# Patient Record
(demographics unavailable — no encounter records)

---

## 2024-10-17 NOTE — PROCEDURE
[Today's Date:] : Date: [unfilled] [Therapeutic] : therapeutic [#1 Site: ______] : #1 site identified in the [unfilled] [Alcohol] : alcohol [Tolerated Well] : the patient tolerated the procedure well [No Complications] : there were no complications [Instructions Given] : handouts/patient instructions were given to patient [de-identified] : ELENITA [de-identified] : 1ml of kenalog-80

## 2024-10-17 NOTE — PHYSICAL EXAM
[TextEntry] : Gen: In NAD HEENT: EOMI Resp: Non-labored breathing Musculoskeletal: Normal gait, no clubbing or cyanosis of the fingernails, no joint swelling seen and muscle strength and tone were normal.  Bilateral shoulders with FROM, slight pain in R shoulder with abduction to 90 degrees. No active synovitis, +[place] nodes. R foot with slightly larger arch.

## 2024-10-17 NOTE — HISTORY OF PRESENT ILLNESS
[FreeTextEntry1] : 85 y/o woman with hx of osteoporosis and atrial tachycardia, diagnosed with afib, s/p ablation,  HTN, cardiac stents, managed by me for osteoporosis, shoulder impingement, and sciatica in the past.  Last seen in July 2024 for bilateral shoulder pain. S/p IM Kenalog injections which provides relief. Pt here again complaining of bilateral shoulder pain (R>L). Has difficulty sleeping at night due to pain.  She had her US guided left shoulder injected previously, and it was helpful.   However, currently prefers IM shot instead of joint injection.   S/p L knee replacement (had previously had steroid and gel injections that did not help). Reports significant improvement in pain, including resolution of L hip pain.  She saw Dr. Mancera at [facility]  S/p dental implant, however never returned to office for prolia shot. Is very overdue She takes Ca/Vit D She takes 1000IU daily Vit D and 2000mg Calcium Most recent Vit D level 43   Asking if she should have orthotics for her shoes, reports worsening arch in R foot which causes discomfort   No hx of fractures  Alcohol intake occasional glass of wine  Non-smoker   Her prior rheum, Dr. Martins, who used to give her prolia, retired.  DEXAs T score -2.2 11/19/19 FRAX score 17%/5%  2021 FRAX is 17/5.3% T score -2.1  2023 T score -2.0 States she has hx of prior Bone density showing T score in osteoporotic range, many years ago.

## 2024-10-17 NOTE — ASSESSMENT
[FreeTextEntry1] : 82 y/o woman with hx of osteoporosis and atrial tachycardia, presents for management of osteoporosis. She also has c/o bilateral shoulder pain (R>L)   S/p dental implant, however never resumed prolia  Informed her of rebound worsening of osteoporosis/fracture with delay or abrupt stop of prolia, thus it is imperative to restart as soon as possible denies any further upcoming dental work  Has cataract surgery soon, prefers to restart prolia after cataract surgery DEXA 4/26/2023 T score -2.0. CMP, Vit D levels reviewed from outside labs. C/w calcium and Vit D supplementation   b/L shoulder pain Left not very active currently Right shoulder with pain at 90 degree mike, likely impingement.   States the IM kenalog injection usually helps her shoulder pains.  S/p IM Kenalog injection today in RUE (see procedure note)   Injected b/L GTB with 80mg kenalog previously, but she finds it's more helpful under US guidance, so now gets that done at ortho office. Reports improvement in L hip pain since TKR    Will set up appt with RNs for prolia injection otherwise RPA with me 6 months

## 2025-01-17 NOTE — PHYSICAL EXAM
[de-identified] : LEFT TENDERNESS [de-identified] : LEFT WEAK CAPILARIES KISSELBACH CAUTERIZED [Normal] : mucosa is normal [Midline] : trachea located in midline position [Edentulous] : edentulous [de-identified] : UPPER TEETH IMPLANTS/ LOWER PROSTHESIS

## 2025-01-17 NOTE — ASSESSMENT
[FreeTextEntry1] : MUPIROCIN BID PT TAKING PROLIA AND SIDE EFFECTS TO BE MONITORED EPISTAXIS INSTRUCTIONS F/U ONE MONTH

## 2025-02-25 NOTE — PHYSICAL EXAM
[de-identified] : LEFT TENDERNESS [de-identified] : LEFT WEAK CAPILARIES KISSELBACH CAUTERIZED ARE STILL IRRITATED/ RIGHT NASAL MUCOSAL IRRITATION [Normal] : mucosa is normal [Midline] : trachea located in midline position [Edentulous] : edentulous [de-identified] : UPPER TEETH IMPLANTS/ LOWER PROSTHESIS

## 2025-02-25 NOTE — PHYSICAL EXAM
[de-identified] : LEFT TENDERNESS [de-identified] : LEFT WEAK CAPILARIES KISSELBACH CAUTERIZED ARE STILL IRRITATED/ RIGHT NASAL MUCOSAL IRRITATION [Normal] : mucosa is normal [Midline] : trachea located in midline position [Edentulous] : edentulous [de-identified] : UPPER TEETH IMPLANTS/ LOWER PROSTHESIS

## 2025-02-25 NOTE — HISTORY OF PRESENT ILLNESS
[de-identified] :  EPISTAXIS AT TIMES/ F/U / NOT AS SEVER AS BEFORE ELIQUIS/ [facility] HX REVIEWED

## 2025-02-25 NOTE — HISTORY OF PRESENT ILLNESS
[de-identified] :  EPISTAXIS AT TIMES/ F/U / NOT AS SEVER AS BEFORE ELIQUIS/ [facility] HX REVIEWED

## 2025-03-25 NOTE — ASSESSMENT
[FreeTextEntry1] : MUPIROCIN BID EPISTAXIS INSTRUCTIONS GERD INSTRUCTIONS MYLANTA  F/U ONE MONTH PRN

## 2025-03-25 NOTE — REASON FOR VISIT
[Subsequent Evaluation] : a subsequent evaluation for [FreeTextEntry2] : throat discomfort, nasal drip

## 2025-03-25 NOTE — HISTORY OF PRESENT ILLNESS
[de-identified] :  EPISTAXIS F/U NO FURTHER BLEED ELIQUIS/ [facility] HX REVIEWED SOME PND AT TIMES

## 2025-03-25 NOTE — PHYSICAL EXAM
[de-identified] : LEFT TENDERNESS [de-identified] : LEFT WEAK CAPILARIES KISSELBACH CAUTERIZED ARE STILL IRRITATED/ RIGHT NASAL MUCOSAL IRRITATION OVERAL IMPROVING [Normal] : mucosa is normal [Midline] : trachea located in midline position [Edentulous] : edentulous [de-identified] : COATED TONGUE [de-identified] : UPPER TEETH IMPLANTS/ LOWER PROSTHESIS

## 2025-03-25 NOTE — PHYSICAL EXAM
[de-identified] : LEFT TENDERNESS [de-identified] : LEFT WEAK CAPILARIES KISSELBACH CAUTERIZED ARE STILL IRRITATED/ RIGHT NASAL MUCOSAL IRRITATION OVERAL IMPROVING [Normal] : mucosa is normal [Midline] : trachea located in midline position [Edentulous] : edentulous [de-identified] : COATED TONGUE [de-identified] : UPPER TEETH IMPLANTS/ LOWER PROSTHESIS

## 2025-03-25 NOTE — HISTORY OF PRESENT ILLNESS
[de-identified] :  EPISTAXIS F/U NO FURTHER BLEED ELIQUIS/ [facility] HX REVIEWED SOME PND AT TIMES